# Patient Record
Sex: MALE | Race: OTHER | HISPANIC OR LATINO | Employment: UNEMPLOYED | URBAN - METROPOLITAN AREA
[De-identification: names, ages, dates, MRNs, and addresses within clinical notes are randomized per-mention and may not be internally consistent; named-entity substitution may affect disease eponyms.]

---

## 2017-01-24 ENCOUNTER — HOSPITAL ENCOUNTER (OUTPATIENT)
Dept: RADIOLOGY | Facility: HOSPITAL | Age: 22
Discharge: HOME | End: 2017-01-24
Payer: COMMERCIAL

## 2017-01-24 ENCOUNTER — TRANSCRIBE ORDERS (OUTPATIENT)
Dept: RADIOLOGY | Facility: HOSPITAL | Age: 22
End: 2017-01-24

## 2017-01-24 DIAGNOSIS — Z48.89 ENCOUNTER FOR OTHER SPECIFIED SURGICAL AFTERCARE: ICD-10-CM

## 2017-01-24 DIAGNOSIS — M54.2 CERVICALGIA: ICD-10-CM

## 2017-01-24 DIAGNOSIS — G95.9 DISEASE OF SPINAL CORD (HCC): ICD-10-CM

## 2017-01-24 PROCEDURE — 72040 X-RAY EXAM NECK SPINE 2-3 VW: CPT

## 2017-01-26 ENCOUNTER — GENERIC CONVERSION - ENCOUNTER (OUTPATIENT)
Dept: OTHER | Facility: OTHER | Age: 22
End: 2017-01-26

## 2017-01-26 ENCOUNTER — ALLSCRIPTS OFFICE VISIT (OUTPATIENT)
Dept: OTHER | Facility: OTHER | Age: 22
End: 2017-01-26

## 2018-01-11 NOTE — MISCELLANEOUS
Discussion/Summary  Discussion Summary:   Patient is following up with Dr Dalia Ag  History of Present Illness  TCM Communication [facility]: He was hospitalized at [facility]  The date of admission: 12/12/16, date of discharge: [id number], CERVICALGIA ( [address])  He was discharged to home  Follow-up appointments with other specialists: 12/27 2 WK F/U [facility] FOR INCISION CHECK, 6 WK F/U DR John Hernandez 1/26/17 @ 10:00  Communication performed and completed by MELODIE KNAPP      Active Problems    1  Cervical myelopathy (721 1) (G95 9)   2  Cervicalgia (723 1) (M54 2)   3  Depression screening (V79 0) (Z13 89)   4  Facet arthropathy, cervical (721 0) (M12 88)   5  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) (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 018)   6  History of benign schwannoma (V12 49) (Z86 018)   7  History of spinal cord compression (V12 49) (Z86 69)   8  Pre-op examination (V72 84) (Z01 818)   9  [place]-neck deformity, acquired (736 22) (M20 039)   10  Trapezius muscle spasm (728 85) (G46 823)    Surgical History    1  History of Laminectomy Decompressive Up To Two Cervical Segments    Family History  Mother    1  No pertinent family history  Father    2  No pertinent family history  Family History    3  No pertinent family history    Social History    · Employed   · Lives with parents   · Never a smoker   · No alcohol use   · No drug use   · Non-smoker (V49 89) (Z78 9)   · Student    Current Meds   1  Advil 200 MG Oral Tablet; TAKE 3 TABLET 3 TIMES DAILY AS NEEDED Recorded   2  Cyclobenzaprine HCl - 10 MG Oral Tablet; TAKE 1 TABLET 3 TIMES DAILY AS NEEDED;   Last Rx:[id number] Ordered   3  Gabapentin 100 MG Oral Capsule; TAKE 1 CAPSULE 3 TIMES DAILY; Therapy: [id number] to (Evaluate:[id number])  Requested for: 80POZ6554; Last   Rx:[id number] Ordered    Allergies    1  No Known Drug Allergies    Future Appointments    Date/Time Provider Specialty Site   01/26/2017 10:00 AM ALONSO Park   Neurosurgery [facility]     Signatures   Electronically signed by : MYLES May; Dec 27 2016 12:47PM EST                       (Author)    Electronically signed by : ALONSO Armstrong ; Dec 27 2016  7:26PM EST

## 2018-01-11 NOTE — RESULT NOTES
Verified Results  (1) CBC/PLT/DIFF [id number] 04:55PM Lary Cisneros Order Number: [id number]     Test Name Result Flag Reference   WBC COUNT 7 24 Thousand/uL  4 31-10 16   RBC COUNT 5 65 Million/uL H 3 88-5 62   HEMOGLOBIN 16 7 g/dL  12 0-17 0   HEMATOCRIT 48 5 %  36 5-49 3   MCV 86 fL  82-98   MCH 29 6 pg  26 8-34 3   MCHC 34 4 g/dL  31 4-37 4   RDW 13 1 %  11 6-15 1   MPV 10 0 fL  8 9-12 7   PLATELET COUNT 303 Thousands/uL  149-390   nRBC AUTOMATED 0 /100 WBCs     NEUTROPHILS RELATIVE PERCENT 54 %  43-75   LYMPHOCYTES RELATIVE PERCENT 34 %  14-44   MONOCYTES RELATIVE PERCENT 10 %  4-12   EOSINOPHILS RELATIVE PERCENT 2 %  0-6   BASOPHILS RELATIVE PERCENT 0 %  0-1   NEUTROPHILS ABSOLUTE COUNT 3 92 Thousands/?L  1 85-7 62   LYMPHOCYTES ABSOLUTE COUNT 2 45 Thousands/?L  0 60-4 47   MONOCYTES ABSOLUTE COUNT 0 72 Thousand/?L  0 17-1 22   EOSINOPHILS ABSOLUTE COUNT 0 13 Thousand/?L  0 00-0 61   BASOPHILS ABSOLUTE COUNT 0 02 Thousands/?L  0 00-0 10   - Patient Instructions: This bloodwork is non-fasting  Please drink two glasses of water morning of bloodwork  - Patient Instructions: This bloodwork is non-fasting  Please drink two glasses of water morning of bloodwork  (1) COMPREHENSIVE METABOLIC PANEL [id number] 44:55EJ Lary Cisneros Order Number: [id number]     Test Name Result Flag Reference   GLUCOSE,RANDM 76 mg/dL     If the patient is fasting, the ADA then defines impaired fasting glucose as > 100 mg/dL and diabetes as > or equal to 123 mg/dL     SODIUM 141 mmol/L  136-145   POTASSIUM 4 5 mmol/L  3 5-5 3   CHLORIDE 103 mmol/L  100-108   CARBON DIOXIDE 30 mmol/L  21-32   ANION GAP (CALC) 8 mmol/L  4-13   BLOOD UREA NITROGEN 13 mg/dL  5-25   CREATININE 1 13 mg/dL  0 60-1 30   Standardized to IDMS reference method   CALCIUM 9 6 mg/dL  8 3-10 1   BILI, TOTAL 0 66 mg/dL  0 20-1 00   ALK PHOSPHATAS 66 U/L     ALT (SGPT) 52 U/L  12-78   AST(SGOT) 24 U/L  5-45   ALBUMIN 4 5 g/dL  3 5-5 0   TOTAL PROTEIN 8 6 g/dL H 6 4-8 2   eGFR Non-African American      >60 0 ml/min/1 73sq [facility] [employer] Disease Education Program recommendations are as follows:  GFR calculation is accurate only with a steady state creatinine  Chronic Kidney disease less than 60 ml/min/1 73 sq  meters  Kidney failure less than 15 ml/min/1 73 sq  meters

## 2018-01-13 VITALS — RESPIRATION RATE: 16 BRPM | HEIGHT: 73 IN | BODY MASS INDEX: 22.66 KG/M2 | WEIGHT: 171 LBS

## 2018-01-15 NOTE — RESULT NOTES
Message   Patient scheduled for postoperative 6 week appt with Dr Harris Necessary on 1/27/17     Verified Results  * XR SPINE CERVICAL 2 OR 3 VW INJURY 24Jan2017 10:54AM Kristy Enriquez Order Number: [id number]   Performing Comments: upright ap lat cervical spine xrays     Test Name Result Flag Reference   XR SPINE CERVICAL 2 OR 3 VW (Report)     CERVICAL SPINE     INDICATION: Follow up surgery     COMPARISON: 12/13/2016     VIEWS: AP and lateral; 2 images     FINDINGS:     Stable postsurgical alignment status post posterior fusion of C2-T1  No evidence of fixation device complication  Stable mild disc height loss at C5-6  The prevertebral soft tissues are within normal limits  The lung apices are intact  IMPRESSION:     Stable postsurgical changes status post posterior fusion of C2-T1         Workstation performed: [id number]     Signed by:   Quyen Garcia MD   1/25/17

## 2018-01-15 NOTE — PROGRESS NOTES
Chief Complaint  Patient presents post C2-T1 lateral mass and pedicle screw fixation fusion  History of Present Illness  HPI: Patient presents for 2 week POV for incision check with his mother  He reports that he is doing well after surgery and is currently wearing his cervical collar  Mild-moderate incisional pain  He denies any incisional issues or fevers and is not experiencing any numbness or tingling  Active Problems    1  Cervical myelopathy (721 1) (G95 9)   2  Cervicalgia (723 1) (M54 2)   3  Depression screening (V79 0) (Z13 89)   4  Facet arthropathy, cervical (721 0) (M12 88)   5  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) (Z86 018)   6  History of benign schwannoma (V12 49) (Z86 018)   7  History of spinal cord compression (V12 49) (Z86 69)   8  Pre-op examination (V72 84) (Z01 818)   9  [place]-neck deformity, acquired (736 22) (M20 039)   10  Trapezius muscle spasm (728 85) (J44 929)    Current Meds   1  Advil 200 MG Oral Tablet; TAKE 3 TABLET 3 TIMES DAILY AS NEEDED Recorded   2  Cyclobenzaprine HCl - 10 MG Oral Tablet; TAKE 1 TABLET 3 TIMES DAILY AS NEEDED;   Last Rx:[id number] Ordered   3  Gabapentin 100 MG Oral Capsule; TAKE 1 CAPSULE 3 TIMES DAILY; Therapy: [id number] to (Evaluate:16Rvv9153)  Requested for: 98NVN9460; Last   Rx:[id number] Ordered    Allergies    1  No Known Drug Allergies    Vitals  Signs    Temperature: 98 8 F  Respiration: 18  Systolic: 691  Diastolic: 78  Height: 6 ft 1 in  Weight: 176 lb   BMI Calculated: 23 22  BSA Calculated: 2 04  BMI Percentile: 53 %  2-20 Stature Percentile: 88 %  2-20 Weight Percentile: 77 %    Procedure  Procedure: suture removal  The wound was located on the (posterior midline neck)  Wound Exam: well healed with no sign of infection, Incision well approximated  No edema, erythema or drainage present  Procedure Note: 1 sutures were removed, 35 staples were removed  Patient Status:  the patient tolerated the procedure well  Complications: Incision VICTORINO  C-Collar in place  there were no complications  Plan  Cervicalgia, [place]-neck deformity, acquired    · OxyCODONE HCl - 10 MG Oral Tablet; TAKE 1 TABLET Every 6 hours PRN pain    Discussion/Summary    Reviewed incision care with patient and mother including daily observation for s/s infection including: increased erythema, edema, drainage, dehiscence of incision or fever >101  Advised to continue cleansing area with mild soap and water and pat dry, but not to apply any creams, lotions or ointments and not to submerge in any water  Patient to maintain activity restrictions and to continue wearing cervical collar until cleared by the surgeon  Verified date/time/location of his upcoming POV and reminded him to have his x-rays completed 2-3 days prior  Patient to call our office with any further questions or concerns or if any of the s/s infection that we discussed today would arise  Refill of Oxy 10mg; 1 tablet Q6 prn pain #60, was provided to the patient today per EM for ongoing pain management  Future Appointments    Date/Time Provider Specialty Site   01/26/2017 10:00 AM ALONSO Bynum   [facility]surgery [facility]     Signatures   Electronically signed by : Nate Zaragoza RN; Dec 27 2016 11:46AM EST                       (Author)    Electronically signed by : ALONSO Rodriguez ; Dec 30 2016  5:48AM EST                       (Author)

## 2024-01-25 ENCOUNTER — TELEPHONE (OUTPATIENT)
Dept: NEUROSURGERY | Facility: CLINIC | Age: 29
End: 2024-01-25

## 2024-01-25 NOTE — TELEPHONE ENCOUNTER
Received a call from Myles reporting that he has had complete recurrence of presurgical symptoms. He states this is primarily in his mid and lower back and states he also has stiffness of his neck.     This is beginning to affect his daily life. Advised that he may return to the office with AP to reestablish since we have not seen him since 2017. He states he had MRIs of his entire back completed in September but physicians have been unable to find a cause for his symptoms. He asked if his visit could be virtual. He is currently residing in [place]. Explained we can not hold a virtual appt with someone out of the state also would likely need phsycial assessment since has not been seen for so long.     He will make arrangements to visit the area for OV and contact us back when he ready to proceed with scheduling.